# Patient Record
Sex: MALE | Race: WHITE | NOT HISPANIC OR LATINO | URBAN - METROPOLITAN AREA
[De-identification: names, ages, dates, MRNs, and addresses within clinical notes are randomized per-mention and may not be internally consistent; named-entity substitution may affect disease eponyms.]

---

## 2021-08-23 ENCOUNTER — OFFICE VISIT (OUTPATIENT)
Dept: URGENT CARE | Facility: CLINIC | Age: 31
End: 2021-08-23
Payer: COMMERCIAL

## 2021-08-23 VITALS
SYSTOLIC BLOOD PRESSURE: 100 MMHG | HEART RATE: 78 BPM | OXYGEN SATURATION: 100 % | RESPIRATION RATE: 16 BRPM | WEIGHT: 135 LBS | DIASTOLIC BLOOD PRESSURE: 66 MMHG | TEMPERATURE: 98.2 F

## 2021-08-23 DIAGNOSIS — S90.32XA: Primary | ICD-10-CM

## 2021-08-23 PROBLEM — K14.8 TONGUE EDEMA: Status: ACTIVE | Noted: 2021-08-23

## 2021-08-23 PROBLEM — F10.21 OTHER AND UNSPECIFIED ALCOHOL DEPENDENCE, IN REMISSION: Status: ACTIVE | Noted: 2021-08-23

## 2021-08-23 PROBLEM — F32.A DEPRESSION: Status: ACTIVE | Noted: 2021-08-23

## 2021-08-23 PROBLEM — F45.8 BRUXISM: Status: ACTIVE | Noted: 2021-08-23

## 2021-08-23 PROBLEM — M54.50 LOW BACK PAIN: Status: ACTIVE | Noted: 2021-08-23

## 2021-08-23 PROBLEM — Z00.00 ROUTINE GENERAL MEDICAL EXAMINATION AT A HEALTH CARE FACILITY: Status: ACTIVE | Noted: 2021-08-23

## 2021-08-23 PROBLEM — F39 EPISODIC MOOD DISORDER (HCC): Status: ACTIVE | Noted: 2021-08-23

## 2021-08-23 PROBLEM — F33.1 MODERATE EPISODE OF RECURRENT MAJOR DEPRESSIVE DISORDER (HCC): Status: ACTIVE | Noted: 2021-08-23

## 2021-08-23 PROBLEM — Z02.89 ENCOUNTER FOR OCCUPATIONAL HISTORY AND PHYSICAL EXAMINATION: Status: ACTIVE | Noted: 2021-08-23

## 2021-08-23 PROBLEM — M25.529 ARTHRALGIA OF ELBOW: Status: ACTIVE | Noted: 2021-08-23

## 2021-08-23 PROBLEM — Z51.89 OTHER SPECIFIED REHABILITATION PROCEDURE: Status: ACTIVE | Noted: 2021-08-23

## 2021-08-23 PROBLEM — M62.81 MUSCLE WEAKNESS: Status: ACTIVE | Noted: 2021-08-23

## 2021-08-23 PROBLEM — Z78.9 CONDITION INFLUENCING HEALTH STATUS: Status: ACTIVE | Noted: 2021-08-23

## 2021-08-23 PROBLEM — M62.830 BACK MUSCLE SPASM: Status: ACTIVE | Noted: 2021-08-23

## 2021-08-23 PROBLEM — F48.9 NONPSYCHOTIC MENTAL DISORDER: Status: ACTIVE | Noted: 2021-08-23

## 2021-08-23 PROBLEM — E11.9 DIABETIC EYE EXAM (HCC): Status: ACTIVE | Noted: 2021-08-23

## 2021-08-23 PROBLEM — F51.04 PSYCHOPHYSIOLOGICAL INSOMNIA: Status: ACTIVE | Noted: 2021-08-23

## 2021-08-23 PROBLEM — R25.9 ABNORMAL INVOLUNTARY MOVEMENT: Status: ACTIVE | Noted: 2021-08-23

## 2021-08-23 PROBLEM — R26.9 ABNORMAL GAIT: Status: ACTIVE | Noted: 2021-08-23

## 2021-08-23 PROBLEM — F51.5 NIGHTMARE DISORDER: Status: ACTIVE | Noted: 2021-08-23

## 2021-08-23 PROBLEM — M77.00 MEDIAL EPICONDYLITIS: Status: ACTIVE | Noted: 2021-08-23

## 2021-08-23 PROBLEM — R53.83 FATIGUE: Status: ACTIVE | Noted: 2021-08-23

## 2021-08-23 PROBLEM — S06.0X9A BRAIN CONCUSSION: Status: ACTIVE | Noted: 2021-08-23

## 2021-08-23 PROBLEM — F43.10 POSTTRAUMATIC STRESS DISORDER: Status: ACTIVE | Noted: 2021-08-23

## 2021-08-23 PROBLEM — F10.20 ALCOHOL DEPENDENCE (HCC): Status: ACTIVE | Noted: 2021-08-23

## 2021-08-23 PROBLEM — R41.3 MEMORY LOSS: Status: ACTIVE | Noted: 2021-08-23

## 2021-08-23 PROBLEM — F43.12 CHRONIC POST-TRAUMATIC STRESS DISORDER: Status: ACTIVE | Noted: 2021-08-23

## 2021-08-23 PROBLEM — H61.20 EXCESSIVE CERUMEN IN EAR CANAL: Status: ACTIVE | Noted: 2021-08-23

## 2021-08-23 PROBLEM — Z01.10 OTHER EXAMINATION OF EARS AND HEARING: Status: ACTIVE | Noted: 2021-08-23

## 2021-08-23 PROBLEM — R41.89 IMPAIRED COGNITION: Status: ACTIVE | Noted: 2021-08-23

## 2021-08-23 PROBLEM — R31.29 MICROSCOPIC HEMATURIA: Status: ACTIVE | Noted: 2021-08-23

## 2021-08-23 PROBLEM — R53.81 MALAISE: Status: ACTIVE | Noted: 2021-08-23

## 2021-08-23 PROBLEM — Z87.820 PERSONAL HISTORY OF TRAUMATIC BRAIN INJURY: Status: ACTIVE | Noted: 2021-08-23

## 2021-08-23 PROBLEM — S06.9X9S LATE EFFECT OF INTRACRANIAL INJURY (HCC): Status: ACTIVE | Noted: 2021-08-23

## 2021-08-23 PROBLEM — L01.02 PUSTULAR FOLLICULITIS: Status: ACTIVE | Noted: 2021-08-23

## 2021-08-23 PROBLEM — D48.5 NEOPLASM OF UNCERTAIN BEHAVIOR OF SKIN: Status: ACTIVE | Noted: 2021-08-23

## 2021-08-23 PROBLEM — F51.05 INSOMNIA RELATED TO ANOTHER MENTAL DISORDER: Status: ACTIVE | Noted: 2021-08-23

## 2021-08-23 PROBLEM — Z32.3: Status: ACTIVE | Noted: 2021-08-23

## 2021-08-23 PROBLEM — H52.00 HYPEROPIA: Status: ACTIVE | Noted: 2021-08-23

## 2021-08-23 PROBLEM — H52.229 REGULAR ASTIGMATISM: Status: ACTIVE | Noted: 2021-08-23

## 2021-08-23 PROBLEM — R22.9 LOCALIZED SUPERFICIAL SWELLING, MASS, OR LUMP: Status: ACTIVE | Noted: 2021-08-23

## 2021-08-23 PROBLEM — R44.3 HALLUCINATIONS: Status: ACTIVE | Noted: 2021-08-23

## 2021-08-23 PROBLEM — B35.3 TINEA PEDIS: Status: ACTIVE | Noted: 2021-08-23

## 2021-08-23 PROBLEM — G47.9 DISTURBANCE IN SLEEP BEHAVIOR: Status: ACTIVE | Noted: 2021-08-23

## 2021-08-23 PROBLEM — F10.10 ALCOHOL ABUSE: Status: ACTIVE | Noted: 2021-08-23

## 2021-08-23 PROBLEM — Z01.00 DIABETIC EYE EXAM (HCC): Status: ACTIVE | Noted: 2021-08-23

## 2021-08-23 PROBLEM — G47.00 INSOMNIA: Status: ACTIVE | Noted: 2021-08-23

## 2021-08-23 PROBLEM — F43.29 ADJUSTMENT DISORDER WITH MIXED EMOTIONAL FEATURES: Status: ACTIVE | Noted: 2021-08-23

## 2021-08-23 PROBLEM — Z02.71 ISSUE OF MEDICAL CERTIFICATE FOR DISABILITY EXAMINATION: Status: ACTIVE | Noted: 2021-08-23

## 2021-08-23 PROBLEM — IMO0001 SPEECH THERAPY: Status: ACTIVE | Noted: 2021-08-23

## 2021-08-23 PROBLEM — M94.20 CHONDROMALACIA: Status: ACTIVE | Noted: 2021-08-23

## 2021-08-23 PROBLEM — H53.029 REFRACTIVE AMBLYOPIA: Status: ACTIVE | Noted: 2021-08-23

## 2021-08-23 PROBLEM — Z02.89 HEALTH EXAMINATION OF DEFINED SUBPOPULATION: Status: ACTIVE | Noted: 2021-08-23

## 2021-08-23 PROBLEM — F29 ATYPICAL PSYCHOSIS (HCC): Status: ACTIVE | Noted: 2021-08-23

## 2021-08-23 PROCEDURE — 99203 OFFICE O/P NEW LOW 30 MIN: CPT | Performed by: FAMILY MEDICINE

## 2021-08-23 NOTE — LETTER
August 23, 2021     Patient: Ean Tom   YOB: 1990   Date of Visit: 8/23/2021       To Whom it May Concern:    Ean Tom was seen in my clinic on 8/23/2021  Please excuse from work for Wednesday 08/25/2021  If you have any questions or concerns, please don't hesitate to call           Sincerely,          Seema Adame MD

## 2021-08-23 NOTE — PROGRESS NOTES
[facility]        NAME: Lazara Mendoza is a 33 y o  male  : 1990    MRN: [id number]  DATE: 2021  TIME: 6:36 PM    Assessment and Plan   Contusion of plantar aspect of left foot, initial encounter [S90 [id number]]  1  Contusion of plantar aspect of left foot, initial encounter  Crutches    Post Op Shoe         Patient Instructions     Patient Instructions   1  Contusion of plantar aspect of left foot  - rest the foot and keep it elevated  - apply ice to the site  - take Tylenol or Motrin as needed for pain   - ace wrap applied to the foot, use as directed  - hard sole shoe provided for comfort and support, use as directed  - crutches provided to assist with ambulation, to be used as directed as needed, advised weight bearing as tolerated  - follow up w/ PCP for re-check in 3-5 days     Follow up with PCP in 3-5 days  Proceed to  ER if symptoms worsen  Chief Complaint     Chief Complaint   Patient presents with    Foot Injury     left foot injury, stepped on  bar tool         History of Present Illness       32 yo male presents for an injury of the left foot that occurred while at home today  Patient states he was walking down a ladder and accidentally stepped on a blunt end of a metal tool with his left foot  He is complaining of pain and swelling on the plantar surface of the left foot  He notes 3 blood blisters present under the skin and some bruising present at the site  No open wounds or bleeding  No numbness/tingling or weakness of the leg or foot  He is experiencing pain with walking and bearing weight on the left foot  No ankle pain or swelling  He has applied ice to the site, no other treatment attempted  Review of Systems   Review of Systems   Constitutional: Negative  Respiratory: Negative  Cardiovascular: Negative  Gastrointestinal: Negative      Musculoskeletal:        As noted in HPI   Skin:        As noted in HPI   Allergic/Immunologic:        As noted in chart   Neurological: Negative  Hematological: Negative  Current Medications     No current outpatient medications on file  Current Allergies     Allergies as of 08/23/2021 - Reviewed 08/23/2021   Allergen Reaction Noted    Doxepin Anxiety and Confusion 12/04/2011    Mirtazapine Anxiety 11/16/2011            The following portions of the patient's history were reviewed and updated as appropriate: allergies, current medications, past family history, past medical history, past social history, past surgical history and problem list      Past Medical History:   Diagnosis Date    Patient denies medical problems        Past Surgical History:   Procedure Laterality Date    NO PAST SURGERIES         History reviewed  No pertinent family history  Medications have been verified  Objective   /66   Pulse 78   Temp 98 2 °F (36 8 °C)   Resp 16   Wt 61 2 kg (135 lb)   SpO2 100%   No LMP for male patient  Physical Exam     Physical Exam  Vitals and nursing note reviewed  Constitutional:       General: He is awake  He is not in acute distress  Appearance: Normal appearance  He is well-developed and well-groomed  He is not ill-appearing, toxic-appearing or diaphoretic  Cardiovascular:      Rate and Rhythm: Normal rate  Pulses: Normal pulses  Pulmonary:      Effort: Pulmonary effort is normal  No tachypnea, accessory muscle usage or respiratory distress  Musculoskeletal:      Comments: Left ankle: no swelling, erythema, or bruising  No tenderness to palpation  Ankle joint w/ full ROM  Left foot: as noted in skin exam     Skin:     General: Skin is warm and dry  Capillary Refill: Capillary refill takes less than 2 seconds  Findings: Bruising present  No abrasion, abscess, erythema, laceration or wound  Comments: There is mild swelling and tenderness to palpation in the center of the plantar surface soft tissues on the left foot   There is slight bruising present at the site  There are 3 small (< 0 5 cm) blood blisters present under the superficial layer of the skin  No open wounds  No bleeding or drainage from the area  Skin is appropriately warm and intact  No bony tenderness  Sensations intact  2+ pedal pulses  Good capillary refill  Patient is not bearing weight on the left foot during ambulation  Neurological:      Mental Status: He is alert and oriented to person, place, and time  Mental status is at baseline  Psychiatric:         Attention and Perception: Attention and perception normal          Mood and Affect: Mood and affect normal          Speech: Speech normal          Behavior: Behavior normal  Behavior is cooperative  Thought Content:  Thought content normal          Cognition and Memory: Cognition and memory normal          Judgment: Judgment normal

## 2021-08-23 NOTE — PATIENT INSTRUCTIONS
1  Contusion of plantar aspect of left foot  - rest the foot and keep it elevated  - apply ice to the site  - take Tylenol or Motrin as needed for pain   - ace wrap applied to the foot, use as directed  - hard sole shoe provided for comfort and support, use as directed  - crutches provided to assist with ambulation, to be used as directed as needed, advised weight bearing as tolerated     - follow up w/ PCP for re-check in 3-5 days